# Patient Record
Sex: MALE | Race: WHITE | Employment: PART TIME | URBAN - METROPOLITAN AREA
[De-identification: names, ages, dates, MRNs, and addresses within clinical notes are randomized per-mention and may not be internally consistent; named-entity substitution may affect disease eponyms.]

---

## 2021-07-08 ENCOUNTER — OFFICE VISIT (OUTPATIENT)
Dept: ENDOCRINOLOGY | Age: 51
End: 2021-07-08
Payer: OTHER GOVERNMENT

## 2021-07-08 VITALS
HEIGHT: 65 IN | DIASTOLIC BLOOD PRESSURE: 64 MMHG | WEIGHT: 174 LBS | SYSTOLIC BLOOD PRESSURE: 128 MMHG | BODY MASS INDEX: 28.99 KG/M2 | HEART RATE: 61 BPM

## 2021-07-08 DIAGNOSIS — E10.65 TYPE 1 DIABETES MELLITUS WITH HYPERGLYCEMIA (HCC): Primary | ICD-10-CM

## 2021-07-08 DIAGNOSIS — E03.9 HYPOTHYROIDISM, UNSPECIFIED TYPE: ICD-10-CM

## 2021-07-08 DIAGNOSIS — E55.9 VITAMIN D DEFICIENCY: ICD-10-CM

## 2021-07-08 LAB — HBA1C MFR BLD: 7.2 %

## 2021-07-08 PROCEDURE — 83036 HEMOGLOBIN GLYCOSYLATED A1C: CPT | Performed by: INTERNAL MEDICINE

## 2021-07-08 PROCEDURE — 99204 OFFICE O/P NEW MOD 45 MIN: CPT | Performed by: INTERNAL MEDICINE

## 2021-07-08 PROCEDURE — 3051F HG A1C>EQUAL 7.0%<8.0%: CPT | Performed by: INTERNAL MEDICINE

## 2021-07-08 RX ORDER — IBUPROFEN 600 MG/1
1 TABLET ORAL PRN
Qty: 1 KIT | Refills: 5 | Status: SHIPPED | OUTPATIENT
Start: 2021-07-08

## 2021-07-08 NOTE — PROGRESS NOTES
[address] [place] [address] Department of Endocrinology Diabetes and Metabolism   [address] [facility] [address]   Phone: [phone]  Fax: [phone]    Date of Service: 7/8/2021  Primary Care Physician: Vladimir Brito MD  Referring physician: Radu Lackey  Provider: Tri Resendez MD     Reason for the visit:  DM type 1     History of Present Illness: The history is provided by the patient. No  was used. Accuracy of the patient data is excellent. Donald Mabry is a very pleasant 52 y.o. male seen today for diabetes management     Donald Mabry was diagnosed with diabetes long time afo and currently on 670g Medtronic insulin pump   Current pump settings: basal rate 12a 0.7, 7a 0.575, CR 12a 8.5, 5p 7.0, ISF 12a 80, 6p 70, goal , active insulin time 3hr    The patient has been checking blood sugar 4 times a day and most readings at goal   Pt is performing a lot of physical activity at work and reported hard time keeping pump site in place    Most recent A1c results summarized below  Lab Results   Component Value Date    LABA1C 7.2 07/08/2021     Patient has had no hypoglycemic episodes   The patient has been mindful of what has been eating and following diabetic diet as encouraged  I reviewed current medications and the patient has no issues with diabetes medications  Donald Mabry is up to date with eye exam and denied any history of diabetic retinopathy   The patient performs his own feet care  Microvascular complications:  No Retinopathy, Nephropathy or Neuropathy   Macrovascular complications: no CAD, PVD, or Stroke    PAST MEDICAL HISTORY   No past medical history on file. PAST SURGICAL HISTORY   No past surgical history on file. SOCIAL HISTORY   Tobacco:   reports that he has never smoked. He has never used smokeless tobacco.  Alcohol:   reports current alcohol use of about 3.0 standard drinks of alcohol per week.   Drugs:   reports no history of drug use.    FAMILY HISTORY   No family history on file. ALLERGIES AND DRUG REACTIONS   No Known Allergies    CURRENT MEDICATIONS   Current Outpatient Medications   Medication Sig Dispense Refill    Glucagon, rDNA, (GLUCAGON EMERGENCY) 1 MG KIT Inject 1 mg as directed as needed (HYPOGLYCEMIA) 1 kit 5     No current facility-administered medications for this visit. Review of Systems  Constitutional: No fever, no chills, no diaphoresis, no generalized weakness. HEENT: No blurred vision, No sore throat, no ear pain, no hair loss  Neck: denied any neck swelling, difficulty swallowing,   Cardio-pulmonary: No CP, SOB or palpitation, No orthopnea or PND. No cough or wheezing. GI: No N/V/D, no constipation, No abdominal pain, no melena or hematochezia   : Denied any dysuria, hematuria, flank pain, discharge, or incontinence. Skin: denied any rash, ulcer, Hirsute, or hyperpigmentation. MSK: denied any joint deformity, joint pain/swelling, muscle pain, or back pain. Neuro: no numbness, no tingling, no weakness, _    OBJECTIVE    /64   Pulse 61   Ht 5' 5\" (1.651 m)   Wt 174 lb (78.9 kg)   BMI 28.96 kg/m²   BP Readings from Last 4 Encounters:   07/08/21 128/64     Wt Readings from Last 6 Encounters:   07/08/21 174 lb (78.9 kg)       Physical examination:  General: awake alert, oriented x3, no abnormal position or movements. HEENT: normocephalic non-traumatic, no exophthalmos   Neck: supple, no LN enlargement, no thyromegaly, no thyroid tenderness, no JVD. Pulm: Clear equal air entry no added sounds, no wheezing or rhonchi    CVS: S1 + S2, no murmur, no heave. Dorsalis pedis pulse palpable   Abd: soft lax, no tenderness, no organomegaly, audible bowel sounds. Skin: warm, no lesions, no rash.  No callus, no Ulcers, No acanthosis nigricans  Musculoskeletal: No back tenderness, no kyphosis/scoliosis    Neuro: CN intact, Monofilament sensation decreased bilateral , muscle power normal  Psych: normal mood, and affect      Review of Laboratory Data:  I personally reviewed the following lab:  No results found for: WBC, RBC, HGB, HCT, MCV, MCH, MCHC, RDW, PLT, MPV, GRANULOCYTES, BANDS   No results found for: NA, K, CO2, BUN, CREATININE, CALCIUM, LABGLOM, GFRAA   No results found for: TSH, T4FREE, O9RNIEX, FT3, V3SZAGQ, TSI, TPOABS, THGAB  Lab Results   Component Value Date    LABA1C 7.2 07/08/2021     Lab Results   Component Value Date    LABA1C 7.2 07/08/2021     No results found for: TRIG, HDL, LDLCALC, CHOL  No results found for: Betty Mckeon, a 52 y.o.-old male seen in for the following issues     Diabetes Mellitus Type 1    · Under good control   · currently on Medtronic insulin pump with following settings: basal rate 12a 0.7, 7a 0.575, CR 12a 8.5, 5p 7.0, ISF 12a 80, 6p 70, goal , active insulin time 3hr    · The patient is performing a lot of physical activity at work and reported hard time keeping pump site in place  · Will order him Omnipod insulin pump with DEXCOM CGM l  · The patient counseled about the complications of uncontrolled diabetes   · Patient will need routine diabetes maintenance and prevention  · Diabetes labs before next visit     Hypothyroidism   · Check TFT now     I personally reviewed external notes from PCP and other patient's care team providers, and personally interpreted labs associated with the above diagnosis. I also ordered labs to further assess and manage the above addressed medical conditions. Return in about 8 weeks (around 9/2/2021) for DM type 1, VitD deficiency. The above issues were reviewed with the patient who understood and agreed with the plan. Thank you for allowing us to participate in the care of this patient. Please do not hesitate to contact us with any additional questions. Diagnosis Orders   1.  Type 1 diabetes mellitus with hyperglycemia (HCC)  Glucagon, rDNA, (GLUCAGON EMERGENCY) 1 MG KIT    Basic Metabolic Panel    Hemoglobin A1C   2. Hypothyroidism, unspecified type  TSH without Reflex    FREE T4   3. Vitamin D deficiency  Basic Metabolic Panel    Vitamin D 25 Hydroxy     Meryle Laurence MD  Endocrinologist, [facility] Diabetes Care and Endocrinology   [address] [facility] [address]   Phone: [phone]  Fax: [phone]  --------------------------------------------  An electronic signature was used to authenticate this note.  Rowan Howard MD on 7/8/2021 at 11:24 PM

## 2021-07-28 ENCOUNTER — APPOINTMENT (OUTPATIENT)
Dept: CT IMAGING | Age: 51
End: 2021-07-28
Payer: OTHER GOVERNMENT

## 2021-07-28 ENCOUNTER — HOSPITAL ENCOUNTER (EMERGENCY)
Age: 51
Discharge: HOME | End: 2021-07-28
Attending: EMERGENCY MEDICINE
Payer: OTHER GOVERNMENT

## 2021-07-28 VITALS
HEIGHT: 65 IN | DIASTOLIC BLOOD PRESSURE: 72 MMHG | OXYGEN SATURATION: 96 % | HEART RATE: 56 BPM | BODY MASS INDEX: 28.82 KG/M2 | TEMPERATURE: 98 F | RESPIRATION RATE: 12 BRPM | WEIGHT: 173 LBS | SYSTOLIC BLOOD PRESSURE: 112 MMHG

## 2021-07-28 DIAGNOSIS — E10.9 TYPE 1 DIABETES MELLITUS WITHOUT COMPLICATION (HCC): ICD-10-CM

## 2021-07-28 DIAGNOSIS — R51.9 NONINTRACTABLE HEADACHE, UNSPECIFIED CHRONICITY PATTERN, UNSPECIFIED HEADACHE TYPE: Primary | ICD-10-CM

## 2021-07-28 LAB
ANION GAP SERPL CALCULATED.3IONS-SCNC: 8 MMOL/L (ref 7–16)
BASOPHILS ABSOLUTE: 0.07 E9/L (ref 0–0.2)
BASOPHILS RELATIVE PERCENT: 0.7 % (ref 0–2)
BUN BLDV-MCNC: 19 MG/DL (ref 6–20)
CALCIUM SERPL-MCNC: 9.3 MG/DL (ref 8.6–10.2)
CHLORIDE BLD-SCNC: 99 MMOL/L (ref 98–107)
CO2: 29 MMOL/L (ref 22–29)
CREAT SERPL-MCNC: 1 MG/DL (ref 0.7–1.2)
EOSINOPHILS ABSOLUTE: 0.11 E9/L (ref 0.05–0.5)
EOSINOPHILS RELATIVE PERCENT: 1.1 % (ref 0–6)
GFR AFRICAN AMERICAN: >60
GFR NON-AFRICAN AMERICAN: >60 ML/MIN/1.73
GLUCOSE BLD-MCNC: 165 MG/DL (ref 74–99)
HCT VFR BLD CALC: 45.2 % (ref 37–54)
HEMOGLOBIN: 15.2 G/DL (ref 12.5–16.5)
IMMATURE GRANULOCYTES #: 0.02 E9/L
IMMATURE GRANULOCYTES %: 0.2 % (ref 0–5)
LYMPHOCYTES ABSOLUTE: 1.81 E9/L (ref 1.5–4)
LYMPHOCYTES RELATIVE PERCENT: 17.8 % (ref 20–42)
MAGNESIUM: 2.3 MG/DL (ref 1.6–2.6)
MCH RBC QN AUTO: 31.5 PG (ref 26–35)
MCHC RBC AUTO-ENTMCNC: 33.6 % (ref 32–34.5)
MCV RBC AUTO: 93.8 FL (ref 80–99.9)
MONOCYTES ABSOLUTE: 0.61 E9/L (ref 0.1–0.95)
MONOCYTES RELATIVE PERCENT: 6 % (ref 2–12)
NEUTROPHILS ABSOLUTE: 7.57 E9/L (ref 1.8–7.3)
NEUTROPHILS RELATIVE PERCENT: 74.2 % (ref 43–80)
PDW BLD-RTO: 12.4 FL (ref 11.5–15)
PLATELET # BLD: 260 E9/L (ref 130–450)
PMV BLD AUTO: 10.1 FL (ref 7–12)
POTASSIUM SERPL-SCNC: 4.3 MMOL/L (ref 3.5–5)
RBC # BLD: 4.82 E12/L (ref 3.8–5.8)
SODIUM BLD-SCNC: 136 MMOL/L (ref 132–146)
TROPONIN, HIGH SENSITIVITY: 6 NG/L (ref 0–11)
WBC # BLD: 10.2 E9/L (ref 4.5–11.5)

## 2021-07-28 PROCEDURE — 2580000003 HC RX 258: Performed by: EMERGENCY MEDICINE

## 2021-07-28 PROCEDURE — 85025 COMPLETE CBC W/AUTO DIFF WBC: CPT

## 2021-07-28 PROCEDURE — 93005 ELECTROCARDIOGRAM TRACING: CPT | Performed by: PHYSICIAN ASSISTANT

## 2021-07-28 PROCEDURE — 6360000002 HC RX W HCPCS: Performed by: EMERGENCY MEDICINE

## 2021-07-28 PROCEDURE — 96375 TX/PRO/DX INJ NEW DRUG ADDON: CPT

## 2021-07-28 PROCEDURE — 84484 ASSAY OF TROPONIN QUANT: CPT

## 2021-07-28 PROCEDURE — 83735 ASSAY OF MAGNESIUM: CPT

## 2021-07-28 PROCEDURE — 36415 COLL VENOUS BLD VENIPUNCTURE: CPT

## 2021-07-28 PROCEDURE — 80048 BASIC METABOLIC PNL TOTAL CA: CPT

## 2021-07-28 PROCEDURE — 70450 CT HEAD/BRAIN W/O DYE: CPT

## 2021-07-28 PROCEDURE — 99281 EMR DPT VST MAYX REQ PHY/QHP: CPT

## 2021-07-28 PROCEDURE — 96374 THER/PROPH/DIAG INJ IV PUSH: CPT

## 2021-07-28 PROCEDURE — 99283 EMERGENCY DEPT VISIT LOW MDM: CPT

## 2021-07-28 RX ORDER — METOCLOPRAMIDE HYDROCHLORIDE 5 MG/ML
10 INJECTION INTRAMUSCULAR; INTRAVENOUS ONCE
Status: COMPLETED | OUTPATIENT
Start: 2021-07-28 | End: 2021-07-28

## 2021-07-28 RX ORDER — METOCLOPRAMIDE 10 MG/1
10 TABLET ORAL 4 TIMES DAILY PRN
Qty: 20 TABLET | Refills: 0 | Status: SHIPPED | OUTPATIENT
Start: 2021-07-28 | End: 2021-08-02

## 2021-07-28 RX ORDER — 0.9 % SODIUM CHLORIDE 0.9 %
1000 INTRAVENOUS SOLUTION INTRAVENOUS ONCE
Status: COMPLETED | OUTPATIENT
Start: 2021-07-28 | End: 2021-07-28

## 2021-07-28 RX ORDER — DIPHENHYDRAMINE HYDROCHLORIDE 50 MG/ML
25 INJECTION INTRAMUSCULAR; INTRAVENOUS ONCE
Status: COMPLETED | OUTPATIENT
Start: 2021-07-28 | End: 2021-07-28

## 2021-07-28 RX ADMIN — DIPHENHYDRAMINE HYDROCHLORIDE 25 MG: 50 INJECTION, SOLUTION INTRAMUSCULAR; INTRAVENOUS at 16:48

## 2021-07-28 RX ADMIN — METOCLOPRAMIDE HYDROCHLORIDE 10 MG: 5 INJECTION INTRAMUSCULAR; INTRAVENOUS at 16:48

## 2021-07-28 RX ADMIN — SODIUM CHLORIDE 1000 ML: 9 INJECTION, SOLUTION INTRAVENOUS at 16:48

## 2021-07-28 ASSESSMENT — PAIN DESCRIPTION - LOCATION: LOCATION: HEAD

## 2021-07-28 ASSESSMENT — PAIN SCALES - GENERAL: PAINLEVEL_OUTOF10: 4

## 2021-07-28 NOTE — ED NOTES
Currently rating pain at 1/10, reports blurry vision has resolved.       Lizzette Orozco, HERNESTO  07/28/21 1800

## 2021-07-28 NOTE — ED PROVIDER NOTES
Department of Emergency Medicine   ED  Provider Note  Admit Date/RoomTime: 7/28/2021  3:52 PM  ED Room: [facility]          History of Present Illness:  7/28/21, Time: 4:27 PM EDT  Chief Complaint   Patient presents with    Headache     for last 5 days. states also heart rate elevations with them. States some dizziness. Went to chiropractor with no relief. States blurry vision in right eye. Also a type 1 diabetic and has had trouble lately keeping BGL down. States was 210 today                Arcelia Govea is a 52 y.o. male presenting to the ED for HA, beginning several days. The complaint has been persistent, moderate in severity, and worsened by his heart rate being high. Presents for headache. States right-sided unilateral headache for the last several days. Denies any traumas falls or injuries. Reports he notices it worse whenever his heart rate is fast.  Thought he had some blurry vision earlier which is since resolved. Went to a chiropractor without relief. Denies any nausea vomiting chest pain shortness of breath or palpitations. States he notices headache is worse whenever he is exerting himself and his heart rate is quicker. Is an insulin taking diabetic on a pump. Notes blood sugars have been running higher than normal.    Review of Systems:   Pertinent positives and negatives are stated within HPI, all other systems reviewed and are negative.        --------------------------------------------- PAST HISTORY ---------------------------------------------  Past Medical History:  has a past medical history of Diabetes mellitus ([facility] [address] Hyperlipidemia, and Thyroid disease. Past Surgical History:  has no past surgical history on file. Social History:  reports that he has never smoked. He has never used smokeless tobacco. He reports current alcohol use of about 3.0 standard drinks of alcohol per week. He reports that he does not use drugs. Family History: family history is not on file. . Unless otherwise noted, family history is non contributory    The patients home medications have been reviewed. Allergies: Patient has no known allergies. ---------------------------------------------------PHYSICAL EXAM--------------------------------------    Constitutional/General: Alert and oriented x3  Head: Normocephalic and atraumatic no temporal artery tenderness  Eyes: PERRL, EOMI, sclera non icteric no nystagmus. Pupils are 6 mm react bilaterally  Mouth: Oropharynx clear, handling secretions, no trismus, no asymmetry of the posterior oropharynx or uvular edema  Neck: Supple, full ROM, no stridor, no meningeal signs  Respiratory: Lungs clear to auscultation bilaterally,Not in respiratory distress  Cardiovascular:  Regular rate. Regular rhythm. 2+ distal pulses. Equal extremity pulses. Chest: No chest wall tenderness  GI:  Abdomen Soft, Non tender, Non distended. No rebound, guarding, or rigidity. Musculoskeletal: Moves all extremities x 4. Warm and well perfused, no clubbing, cyanosis, or edema. Capillary refill <3 seconds  Integument: skin warm and dry. No rashes. Neurologic: GCS 15, no focal deficits, symmetric strength 5/5 in the upper and lower extremities bilaterally  Psychiatric: Normal Affect      EKG: Interpreted by emergency department physician, Dr. Chucky Luis   This EKG is signed and interpreted by me. Rate: 63  Rhythm: Sinus  Interpretation: Sinus rhythm, normal axis, MA is 154, QRS is 92, QTc is 397. No other acute findings no prior for comparison  Comparison: no previous EKG available      -------------------------------------------------- RESULTS -------------------------------------------------  I have personally reviewed all laboratory and imaging results for this patient. Results are listed below.      LABS: (Lab results interpreted by me)  Results for orders placed or performed during the hospital encounter of 07/28/21   CBC Auto Differential   Result Value Ref Range    WBC 10.2 4.5 - 11.5 E9/L    RBC 4.82 3.80 - 5.80 E12/L    Hemoglobin 15.2 12.5 - 16.5 g/dL    Hematocrit 45.2 37.0 - 54.0 %    MCV 93.8 80.0 - 99.9 fL    MCH 31.5 26.0 - 35.0 pg    MCHC 33.6 32.0 - 34.5 %    RDW 12.4 11.5 - 15.0 fL    Platelets 111 813 - 452 E9/L    MPV 10.1 7.0 - 12.0 fL    Neutrophils % 74.2 43.0 - 80.0 %    Immature Granulocytes % 0.2 0.0 - 5.0 %    Lymphocytes % 17.8 (L) 20.0 - 42.0 %    Monocytes % 6.0 2.0 - 12.0 %    Eosinophils % 1.1 0.0 - 6.0 %    Basophils % 0.7 0.0 - 2.0 %    Neutrophils Absolute 7.57 (H) 1.80 - 7.30 E9/L    Immature Granulocytes # 0.02 E9/L    Lymphocytes Absolute 1.81 1.50 - 4.00 E9/L    Monocytes Absolute 0.61 0.10 - 0.95 E9/L    Eosinophils Absolute 0.11 0.05 - 0.50 E9/L    Basophils Absolute 0.07 0.00 - 0.20 A4/I   Basic Metabolic Panel   Result Value Ref Range    Sodium 136 132 - 146 mmol/L    Potassium 4.3 3.5 - 5.0 mmol/L    Chloride 99 98 - 107 mmol/L    CO2 29 22 - 29 mmol/L    Anion Gap 8 7 - 16 mmol/L    Glucose 165 (H) 74 - 99 mg/dL    BUN 19 6 - 20 mg/dL    CREATININE 1.0 0.7 - 1.2 mg/dL    GFR Non-African American >60 >=60 mL/min/1.73    GFR African American >60     Calcium 9.3 8.6 - 10.2 mg/dL   Troponin   Result Value Ref Range    Troponin, High Sensitivity 6 0 - 11 ng/L   Magnesium   Result Value Ref Range    Magnesium 2.3 1.6 - 2.6 mg/dL   EKG 12 Lead   Result Value Ref Range    Ventricular Rate 63 BPM    Atrial Rate 63 BPM    P-R Interval 154 ms    QRS Duration 92 ms    Q-T Interval 388 ms    QTc Calculation (Bazett) 397 ms    P Axis 66 degrees    R Axis 53 degrees    T Axis 0 degrees   ,       RADIOLOGY:  Interpreted by Radiologist unless otherwise specified  CT HEAD WO CONTRAST   Final Result   No acute intracranial abnormality.                          ------------------------- NURSING NOTES AND VITALS REVIEWED ---------------------------   The nursing notes within the ED encounter and vital signs as below have been reviewed by myself  /72 Comment: MANUAL  Pulse 56   Temp 98 °F (36.7 °C)   Resp 12   Ht 5' 5\" (1.651 m)   Wt 173 lb (78.5 kg)   SpO2 96%   BMI 28.79 kg/m²     Oxygen Saturation Interpretation: Normal    The cardiac monitor revealed NSR with a heart rate in the 70s as interpreted by me. The cardiac monitor was ordered secondary to the patient's heart rate and to monitor the patient for dysrhythmia. CPT 38797    The patients available past medical records and past encounters were reviewed. ------------------------------ ED COURSE/MEDICAL DECISION MAKING----------------------  Medications   diphenhydrAMINE (BENADRYL) injection 25 mg (25 mg Intravenous Given 7/28/21 1648)   metoclopramide (REGLAN) injection 10 mg (10 mg Intravenous Given 7/28/21 1648)   0.9 % sodium chloride bolus (0 mLs Intravenous Stopped 7/28/21 1759)                    Medical Decision Making:     IDr. Ailyn am the primary provider of record    Work-up undertaken. Electrolytes within normal limits. No evidence of high anion gap metabolic acidosis or DKA. Head CT within normal limits. He had near complete resolution of his headache after Benadryl Reglan. Discussed part of care and need for follow-up with primary care. He states understanding agreement      Re-Evaluations:          Re-evaluation. Patients symptoms are improving  Repeat physical examination is improved        This patient's ED course included: a personal history and physicial examination, re-evaluation prior to disposition, IV medications and complex medical decision making and emergency management    This patient has remained hemodynamically stable during their ED course. Counseling: The emergency provider has spoken with the patient and discussed todays results, in addition to providing specific details for the plan of care and counseling regarding the diagnosis and prognosis. Questions are answered at this time and they are agreeable with the plan. --------------------------------- IMPRESSION AND DISPOSITION ---------------------------------    IMPRESSION  1. Nonintractable headache, unspecified chronicity pattern, unspecified headache type    2. [facility]        DISPOSITION  Disposition: Discharge to home  Patient condition is stable        NOTE: This report was transcribed using voice recognition software.  Every effort was made to ensure accuracy; however, inadvertent computerized transcription errors may be present       Lazaro Parker DO  07/28/21 2033

## 2021-07-28 NOTE — ED NOTES
Patient currently off floor in ct      Israel Osorio, [facility]  07/28/21 3417

## 2021-07-28 NOTE — ED TRIAGE NOTES
FIRST PROVIDER CONTACT ASSESSMENT NOTE      Department of Emergency Medicine   7/28/21  3:30 PM EDT    Chief Complaint: Headache (for last 5 days. states also heart rate elevations with them. States some dizziness. Went to chiropractor with no relief. States blurry vision in right eye. Also a type 1 diabetic and has had trouble lately keeping BGL down. States was 210 today)      History of Present Illness:    Stephanie Combs is a 52 y.o. male who presents to the ED by private car for intermittent HA x 1 week. Pt c/o dizziness, blurred vision rt eye. Pt also c/o elevated HR  Focused Screening Exam:  Constitutional:  Alert, appears stated age and is in no distress. *ALLERGIES*     Patient has no known allergies.      ED Triage Vitals [07/28/21 1439]   BP Temp Temp src Pulse Resp SpO2 Height Weight   -- 97.5 °F (36.4 °C) -- 74 -- 95 % -- --        Initial Plan of Care:  Initiate Treatment-Testing, Proceed toTreatment Area When Bed Available for ED Attending/MLP to Continue Care    -----------------END OF FIRST PROVIDER CONTACT ASSESSMENT NOTE--------------  Electronically signed by Candace Johnson PA-C   DD: 7/28/21

## 2021-07-29 LAB
EKG ATRIAL RATE: 63 BPM
EKG P AXIS: 66 DEGREES
EKG P-R INTERVAL: 154 MS
EKG Q-T INTERVAL: 388 MS
EKG QRS DURATION: 92 MS
EKG QTC CALCULATION (BAZETT): 397 MS
EKG R AXIS: 53 DEGREES
EKG T AXIS: 0 DEGREES
EKG VENTRICULAR RATE: 63 BPM

## 2021-07-29 PROCEDURE — 93010 ELECTROCARDIOGRAM REPORT: CPT | Performed by: INTERNAL MEDICINE

## 2021-08-31 DIAGNOSIS — E10.65 TYPE 1 DIABETES MELLITUS WITH HYPERGLYCEMIA (HCC): Primary | ICD-10-CM

## 2021-08-31 RX ORDER — INSULIN PUMP CONTROLLER
EACH MISCELLANEOUS
Qty: 45 EACH | Refills: 3 | Status: SHIPPED
Start: 2021-08-31 | End: 2022-02-23 | Stop reason: SDUPTHER

## 2021-09-02 ENCOUNTER — OFFICE VISIT (OUTPATIENT)
Dept: ENDOCRINOLOGY | Age: 51
End: 2021-09-02
Payer: OTHER GOVERNMENT

## 2021-09-02 VITALS
HEART RATE: 60 BPM | OXYGEN SATURATION: 100 % | BODY MASS INDEX: 29.16 KG/M2 | SYSTOLIC BLOOD PRESSURE: 104 MMHG | WEIGHT: 175 LBS | HEIGHT: 65 IN | DIASTOLIC BLOOD PRESSURE: 70 MMHG

## 2021-09-02 DIAGNOSIS — Z96.41 INSULIN PUMP IN PLACE: ICD-10-CM

## 2021-09-02 DIAGNOSIS — E10.65 TYPE 1 DIABETES MELLITUS WITH HYPERGLYCEMIA (HCC): Primary | ICD-10-CM

## 2021-09-02 DIAGNOSIS — E55.9 VITAMIN D DEFICIENCY: ICD-10-CM

## 2021-09-02 DIAGNOSIS — E03.9 PRIMARY HYPOTHYROIDISM: ICD-10-CM

## 2021-09-02 PROCEDURE — 99214 OFFICE O/P EST MOD 30 MIN: CPT | Performed by: CLINICAL NURSE SPECIALIST

## 2021-09-02 PROCEDURE — 3051F HG A1C>EQUAL 7.0%<8.0%: CPT | Performed by: CLINICAL NURSE SPECIALIST

## 2021-09-02 RX ORDER — LEVOTHYROXINE SODIUM 0.03 MG/1
TABLET ORAL
COMMUNITY
Start: 2021-06-08 | End: 2022-02-23

## 2021-09-02 RX ORDER — ATORVASTATIN CALCIUM 40 MG/1
TABLET, FILM COATED ORAL
COMMUNITY
Start: 2021-02-25

## 2021-09-02 NOTE — PROGRESS NOTES
[address] [place] [address] Department of Endocrinology Diabetes and Metabolism   [address] [facility] [address]   Phone: [phone]  Fax: [phone]    Date of Service: 9/2/2021    Primary Care Physician: Rheba Cranker, MD  Referring physician: No ref. provider found  Provider: Christine WELLS    Reason for the visit:Type 1 diabetes       History of Present Illness: The history is provided by the patient. No  was used. Accuracy of the patient data is excellent. Chio Grove is a very pleasant 52 y.o. male seen today for diabetes management   Chio Grove was diagnosed with diabetes long time afo and currently on 670g Medtronic insulin pump   Current pump settings: basal rate 12a 0.75, 7a 0.575, CR 12a 8.5, 5p 7.0, ISF 12a 80, 6p 70, goal , active insulin time 3hr    The patient has been checking blood sugar 4 times a day and most readings at goal   Average 189  Pt is performing a lot of physical activity at work and reported hard time keeping pump site in place    Most recent A1c results summarized below    Lab Results   Component Value Date    LABA1C 7.2 07/08/2021       Patient has had no hypoglycemic episodes   The patient has been mindful of what has been eating and following diabetic diet as encouraged  I reviewed current medications and the patient has no issues with diabetes medications  Chio Grove is up to date with eye exam and denied any history of diabetic retinopathy   The patient performs his own feet care  Microvascular complications:  No Retinopathy, Nephropathy or Neuropathy   Macrovascular complications: no CAD, PVD, or Stroke    PAST MEDICAL HISTORY   Past Medical History:   Diagnosis Date    Asthma     Diabetes mellitus ([address]     Hyperlipidemia     Migraine     Thyroid disease        PAST SURGICAL HISTORY   No past surgical history on file. SOCIAL HISTORY   Tobacco:   reports that he has never smoked.  He has never used smokeless tobacco.  Alcohol:   reports current alcohol use of about 3.0 standard drinks of alcohol per week. Drugs:   reports no history of drug use. FAMILY HISTORY   Family History   Problem Relation Age of Onset    Diabetes type 2  Father     Diabetes Type 1  Maternal Grandmother     Cancer Maternal Grandfather        ALLERGIES AND DRUG REACTIONS   No Known Allergies    CURRENT MEDICATIONS   Current Outpatient Medications   Medication Sig Dispense Refill    atorvastatin (LIPITOR) 40 MG tablet TAKE ONE TABLET BY MOUTH AT BEDTIME FOR CHOLESTEROL      levothyroxine (SYNTHROID) 25 MCG tablet TAKE ONE TABLET BY MOUTH EVERY MORNING, ON AN EMPTY STOMACH 30-60 MINUTES BEFORE BREAKFAST      insulin aspart (NOVOLOG) 100 UNIT/ML injection vial INJECT 50 UNITS SUBCUTANEOUSLY CONTINUOUS VIA PUMP (DISCARD VIAL 28 DAYS AFTER FIRST USE)      Cholecalciferol 50 MCG (2000 UT) TABS TAKE TWO TABLETS BY MOUTH EVERY DAY FOR VITAMIN D      Insulin Disposable Pump (OMNIPOD DASH 5 PACK PODS) MISC To change every 48hrs 45 each 3    Glucagon, rDNA, (GLUCAGON EMERGENCY) 1 MG KIT Inject 1 mg as directed as needed (HYPOGLYCEMIA) 1 kit 5    metoclopramide (REGLAN) 10 MG tablet Take 1 tablet by mouth 4 times daily as needed (Nausea or vomiting or headache) 20 tablet 0     No current facility-administered medications for this visit. Review of Systems  Constitutional: No fever, no chills, no diaphoresis, no generalized weakness. HEENT: No blurred vision, No sore throat, no ear pain, no hair loss  Neck: denied any neck swelling, difficulty swallowing,   Cardio-pulmonary: No CP, SOB or palpitation, No orthopnea or PND. No cough or wheezing. GI: No N/V/D, no constipation, No abdominal pain, no melena or hematochezia   : Denied any dysuria, hematuria, flank pain, discharge, or incontinence. Skin: denied any rash, ulcer, Hirsute, or hyperpigmentation.    MSK: denied any joint deformity, joint pain/swelling, muscle pain, or back pain.  Neuro: no numbness, no tingling, no weakness, _    OBJECTIVE    /70   Pulse 60   Ht 5' 5\" (1.651 m)   Wt 175 lb (79.4 kg)   SpO2 100%   BMI 29.12 kg/m²   BP Readings from Last 4 Encounters:   09/02/21 104/70   07/28/21 112/72   07/08/21 128/64     Wt Readings from Last 6 Encounters:   09/02/21 175 lb (79.4 kg)   07/28/21 173 lb (78.5 kg)   07/08/21 174 lb (78.9 kg)       Physical examination:  General: awake alert, oriented x3, no abnormal position or movements. HEENT: normocephalic non-traumatic, no exophthalmos   Neck: supple, no LN enlargement, no thyromegaly, no thyroid tenderness, no JVD. Pulm: Clear equal air entry no added sounds, no wheezing or rhonchi    CVS: S1 + S2, no murmur, no heave. Dorsalis pedis pulse palpable   Abd: soft lax, no tenderness, no organomegaly, audible bowel sounds. Skin: warm, no lesions, no rash.  No callus, no Ulcers, No acanthosis nigricans  Musculoskeletal: No back tenderness, no kyphosis/scoliosis    Neuro: CN intact,  , muscle power normal  Psych: normal mood, and affect      Review of Laboratory Data:  I personally reviewed the following lab:  Lab Results   Component Value Date/Time    WBC 8.3 08/20/2021 12:00 AM    RBC 4.82 08/20/2021 12:00 AM    HGB 15.5 08/20/2021 12:00 AM    HCT 45.1 08/20/2021 12:00 AM    MCV 93.6 08/20/2021 12:00 AM    MCH 32.2 08/20/2021 12:00 AM    MCHC 34.4 08/20/2021 12:00 AM    RDW 13.2 08/20/2021 12:00 AM     08/20/2021 12:00 AM    MPV 8.4 08/20/2021 12:00 AM      Lab Results   Component Value Date/Time     08/20/2021 12:00 AM    K 3.7 08/20/2021 12:00 AM    CO2 27 08/20/2021 12:00 AM    BUN 14 08/20/2021 12:00 AM    CREATININE 1.1 08/20/2021 12:00 AM    CALCIUM 9.5 08/20/2021 12:00 AM    LABGLOM >60 07/28/2021 03:43 PM    GFRAA >60 07/28/2021 03:43 PM      No results found for: TSH, T4FREE, R6RUCMU, FT3, Q8EHWOI, TSI, TPOABS, THGAB  Lab Results   Component Value Date    LABA1C 7.2 07/08/2021    GLUCOSE 165 08/20/2021     Lab Results   Component Value Date    LABA1C 7.2 07/08/2021     No results found for: TRIG, HDL, LDLCALC, CHOL  No results found for: Jose Angel 30   Nilsa Booker, a 52 y.o.-old male seen in for the following issues       Assessment:      Diagnosis Orders   1. Type 1 diabetes mellitus with hyperglycemia (HCC)  T4, Free    TSH without Reflex    Thyroid Peroxidase Antibody    Comprehensive Metabolic Panel    Vitamin D 25 Hydroxy    Microalbumin / Creatinine Urine Ratio    Lipid Panel   2. Primary hypothyroidism     3. Vitamin D deficiency  Vitamin D 25 Hydroxy   4. Insulin pump in place         Plan:     1. Type 1 diabetes mellitus with hyperglycemia (HCC)   Patient diabetes shows variable control. Blood glucose levels higher in the morning and at bedtime  Plan: Increase midnight basal rate to 0.8, add 7 PM basal of 0.625. Adjust carb ratio at 5 PM to 6.5. Insulin pump settings will look as follows:  basal rate 12a 0.8, 7a 0.575, 7 pm 0.625, CR 12a 8.5, 5p 6.5, ISF 12a 80, 6p 70, goal , active insulin time 3hr   Advised patient to continue checking blood sugars 4 times per day  Advised to call if blood glucose less than 70 or greater than 250 3 consecutive times  Counseled on blood glucose goals fasting less than 130, peak less than 180, random blood glucose less than 150  Very infrequent hypoglycemia  Counseled on hypoglycemia  Patient will be getting OmniPod soon as this is what he prefers since he is more active  Will monitor     2. Primary hypothyroidism   Continue levothyroxine 25 mcg 1 tablet once daily. Patient taking on an empty stomach least 30 minutes before breakfast and without combination of other medications. Will reassess TSH and free T4   3. Vitamin D deficiency  Continue vitamin D supplementation. Will reassess vitamin D   4.  Insulin pump in place            I personally spent greater than 30 minutes revieweingexternal notes from PCP and other patient's care team providers, and personally interpreted labs associated with the above diagnosis. I also ordered labs to further assess and manage the above addressed medical conditions. Return in about 3 months (around 12/2/2021). The above issues were reviewed with the patient who understood and agreed with the plan. Thank you for allowing us to participate in the care of this patient. Please do not hesitate to contact us with any additional questions. Aga WELLS    [facility] Diabetes Care and Endocrinology   [address] [place] [address]   Phone: [phone]  Fax: [phone]  --------------------------------------------  An electronic signature was used to authenticate this note.  Aga WELLS on 9/2/2021 at 2:12 PM

## 2021-09-07 ENCOUNTER — TELEPHONE (OUTPATIENT)
Dept: ENDOCRINOLOGY | Age: 51
End: 2021-09-07

## 2021-09-07 NOTE — TELEPHONE ENCOUNTER
Pt states that he does take atorvastatin daily and has maybe missed 1 or 2 doses within the last couple of weeks.

## 2021-09-07 NOTE — TELEPHONE ENCOUNTER
----- Message from FERNANDO Billy sent at 9/7/2021  8:40 AM EDT -----  Please call patient and inform him I have reviewed labs. His LDL cholesterol is not at goal.  Please inquire if patient is taking atorvastatin daily or missing doses. If he is missing doses I encourage compliance.

## 2022-02-23 ENCOUNTER — OFFICE VISIT (OUTPATIENT)
Dept: ENDOCRINOLOGY | Age: 52
End: 2022-02-23
Payer: OTHER GOVERNMENT

## 2022-02-23 VITALS
HEART RATE: 70 BPM | HEIGHT: 65 IN | WEIGHT: 179 LBS | BODY MASS INDEX: 29.82 KG/M2 | DIASTOLIC BLOOD PRESSURE: 79 MMHG | OXYGEN SATURATION: 96 % | SYSTOLIC BLOOD PRESSURE: 125 MMHG

## 2022-02-23 DIAGNOSIS — E55.9 VITAMIN D DEFICIENCY: ICD-10-CM

## 2022-02-23 DIAGNOSIS — E03.9 HYPOTHYROIDISM, UNSPECIFIED TYPE: ICD-10-CM

## 2022-02-23 DIAGNOSIS — E10.65 TYPE 1 DIABETES MELLITUS WITH HYPERGLYCEMIA (HCC): ICD-10-CM

## 2022-02-23 DIAGNOSIS — E10.65 TYPE 1 DIABETES MELLITUS WITH HYPERGLYCEMIA (HCC): Primary | ICD-10-CM

## 2022-02-23 PROCEDURE — 99214 OFFICE O/P EST MOD 30 MIN: CPT | Performed by: INTERNAL MEDICINE

## 2022-02-23 RX ORDER — INSULIN PUMP CONTROLLER
EACH MISCELLANEOUS
Qty: 45 EACH | Refills: 5 | Status: SHIPPED
Start: 2022-02-23 | End: 2022-03-02 | Stop reason: SDUPTHER

## 2022-02-23 NOTE — PROGRESS NOTES
[address] [facility] Department of Endocrinology Diabetes and Metabolism   [address] [facility] [address]   Phone: [phone]  Fax: [phone]    Date of Service: 2/23/2022  Primary Care Physician: Clint Foster MD  Provider: Rayray Stone MD\     Reason for the visit:Type 1 diabetes       History of Present Illness: The history is provided by the patient. No  was used. Accuracy of the patient data is excellent. Trey Pinzon is a very pleasant 54 y.o. male seen today for diabetes management   Trey Pinzon was diagnosed with diabetes long time afo and currently on 670g Medtronic insulin pump   Current pump settings: basal rate 12a 0.8, 7a 0.6, CR 12a 8.5, 5p 6.5, ISF 75, goal 100-130, active insulin time 3hr    The patient has been checking blood sugar 4 times a day and most readings at goal   No interested in CGM   Pt is performing a lot of physical activity at work and reported hard time keeping pump site in place    Most recent A1c results summarized below  2/2022 - A1c 7.5   Lab Results   Component Value Date    LABA1C 7.2 07/08/2021     The patient has been mindful of what has been eating and following diabetic diet as encouraged  I reviewed current medications and the patient has no issues with diabetes medications  Trey Pinzon is up to date with eye exam and denied any history of diabetic retinopathy   The patient performs his own feet care  Microvascular complications:  No Retinopathy, Nephropathy or Neuropathy   Macrovascular complications: no CAD, PVD, or Stroke    PAST MEDICAL HISTORY   Past Medical History:   Diagnosis Date    Asthma     Diabetes mellitus ([address]     Hyperlipidemia     Migraine     Thyroid disease        PAST SURGICAL HISTORY   No past surgical history on file. SOCIAL HISTORY   Tobacco:   reports that he has never smoked.  He has never used smokeless tobacco.  Alcohol:   reports current alcohol use of about 3.0 standard drinks of alcohol per week. Drugs:   reports no history of drug use. FAMILY HISTORY   Family History   Problem Relation Age of Onset    Diabetes type 2  Father     Diabetes Type 1  Maternal Grandmother     Cancer Maternal Grandfather        ALLERGIES AND DRUG REACTIONS   No Known Allergies    CURRENT MEDICATIONS   Current Outpatient Medications   Medication Sig Dispense Refill    atorvastatin (LIPITOR) 40 MG tablet TAKE ONE TABLET BY MOUTH AT BEDTIME FOR CHOLESTEROL      insulin aspart (NOVOLOG) 100 UNIT/ML injection vial INJECT 50 UNITS SUBCUTANEOUSLY CONTINUOUS VIA PUMP (DISCARD VIAL 28 DAYS AFTER FIRST USE)      Insulin Disposable Pump (OMNIPOD DASH 5 PACK PODS) MISC To change every 48hrs 45 each 5    Cholecalciferol 50 MCG (2000 UT) TABS TAKE TWO TABLETS BY MOUTH EVERY DAY FOR VITAMIN D      metoclopramide (REGLAN) 10 MG tablet Take 1 tablet by mouth 4 times daily as needed (Nausea or vomiting or headache) 20 tablet 0    Glucagon, rDNA, (GLUCAGON EMERGENCY) 1 MG KIT Inject 1 mg as directed as needed (HYPOGLYCEMIA) 1 kit 5     No current facility-administered medications for this visit. Review of Systems  Constitutional: No fever, no chills, no diaphoresis, no generalized weakness. HEENT: No blurred vision, No sore throat, no ear pain, no hair loss  Neck: denied any neck swelling, difficulty swallowing,   Cardio-pulmonary: No CP, SOB or palpitation, No orthopnea or PND. No cough or wheezing. GI: No N/V/D, no constipation, No abdominal pain, no melena or hematochezia   : Denied any dysuria, hematuria, flank pain, discharge, or incontinence. Skin: denied any rash, ulcer, Hirsute, or hyperpigmentation. MSK: denied any joint deformity, joint pain/swelling, muscle pain, or back pain.   Neuro: no numbness, no tingling, no weakness, _    OBJECTIVE    /79   Pulse 70   Ht 5' 5\" (1.651 m)   Wt 179 lb (81.2 kg)   SpO2 96%   BMI 29.79 kg/m²   BP Readings from Last 4 Encounters:   02/23/22 125/79   09/02/21 104/70   07/28/21 112/72   07/08/21 128/64     Wt Readings from Last 6 Encounters:   02/23/22 179 lb (81.2 kg)   09/02/21 175 lb (79.4 kg)   07/28/21 173 lb (78.5 kg)   07/08/21 174 lb (78.9 kg)       Physical examination:  General: awake alert, oriented x3, no abnormal position or movements. HEENT: normocephalic non-traumatic, no exophthalmos   Neck: supple, no LN enlargement, no thyromegaly, no thyroid tenderness, no JVD. Pulm: Clear equal air entry no added sounds, no wheezing or rhonchi    CVS: S1 + S2, no murmur, no heave. Dorsalis pedis pulse palpable   Abd: soft lax, no tenderness, no organomegaly, audible bowel sounds. Skin: warm, no lesions, no rash.  No callus, no Ulcers, No acanthosis nigricans  Musculoskeletal: No back tenderness, no kyphosis/scoliosis    Neuro: CN intact,  , muscle power normal  Psych: normal mood, and affect      Review of Laboratory Data:  I personally reviewed the following lab:  Lab Results   Component Value Date/Time    WBC 8.3 08/20/2021 12:00 AM    RBC 4.82 08/20/2021 12:00 AM    HGB 15.5 08/20/2021 12:00 AM    HCT 45.1 08/20/2021 12:00 AM    MCV 93.6 08/20/2021 12:00 AM    MCH 32.2 08/20/2021 12:00 AM    MCHC 34.4 08/20/2021 12:00 AM    RDW 13.2 08/20/2021 12:00 AM     08/20/2021 12:00 AM    MPV 8.4 08/20/2021 12:00 AM      Lab Results   Component Value Date/Time     09/02/2021 02:12 PM    K 4.4 09/02/2021 02:12 PM    CO2 28 09/02/2021 02:12 PM    BUN 17 09/02/2021 02:12 PM    CREATININE 1.0 09/02/2021 02:12 PM    CALCIUM 10.1 09/02/2021 02:12 PM    LABGLOM >60 09/02/2021 02:12 PM    GFRAA >60 09/02/2021 02:12 PM      Lab Results   Component Value Date/Time    TSH 2.450 09/02/2021 02:12 PM    T4FREE 0.97 09/02/2021 02:12 PM    TPOABS 11.0 09/02/2021 02:12 PM     Lab Results   Component Value Date    LABA1C 7.2 07/08/2021    GLUCOSE 176 09/02/2021    MALBCR - 09/02/2021    LABMICR <12.0 09/02/2021    LABCREA 131 09/02/2021     Lab Results Component Value Date    LABA1C 7.2 07/08/2021     Lab Results   Component Value Date    TRIG 154 09/02/2021    HDL 49 09/02/2021    LDLCALC 119 09/02/2021    CHOL 199 09/02/2021     Lab Results   Component Value Date    VITD25 34 09/02/2021       ASSESSMENT & RECOMMENDATIONS   Alfred Madison, a 54 y.o.-old male seen in for the following issues       Assessment:      Diagnosis Orders   1. Type 1 diabetes mellitus with hyperglycemia (HCC)  Basic Metabolic Panel    Hemoglobin A1C    Lipid Panel    Microalbumin / Creatinine Urine Ratio   2. Vitamin D deficiency  Vitamin D 25 Hydroxy    Basic Metabolic Panel   3. Hypothyroidism, unspecified type  TSH    T4, Free       Plan:     1. Type 1 diabetes mellitus with hyperglycemia (HCC)   · Under good control   · Continue current pump settings:   · Advised patient to continue checking blood sugars 4 times per day  · Counseled on hypoglycemia  · Not interested in CGM at this time      2. Primary hypothyroidism   · Off Levothyroxine few months ago (medication was stopped by his PCP)  · Pt would like to stay of LT4 at this time   · Check TFT at next OV    3. Vitamin D deficiency  · Continue vitamin D supplementation    4. Insulin pump in place      I personally reviewed external notes from PCP and other patient's care team providers, and personally interpreted labs associated with the above diagnosis. I also ordered labs to further assess and manage the above addressed medical conditions    Return in about 6 months (around 8/23/2022) for DM type 1, VitD deficiency. The above issues were reviewed with the patient who understood and agreed with the plan. Thank you for allowing us to participate in the care of this patient. Please do not hesitate to contact us with any additional questions.      Vikki Gabriel MD    [facility] Diabetes Care and Endocrinology   [address] [place] [address]   Phone: [phone]  Fax: [phone]  --------------------------------------------  An electronic signature was used to authenticate this note.  Guerda Royal MD  on 2/23/2022 at 12:58 PM

## 2022-03-02 DIAGNOSIS — E10.65 TYPE 1 DIABETES MELLITUS WITH HYPERGLYCEMIA (HCC): ICD-10-CM

## 2022-03-02 RX ORDER — INSULIN PUMP CONTROLLER
EACH MISCELLANEOUS
Qty: 45 EACH | Refills: 5 | Status: SHIPPED | OUTPATIENT
Start: 2022-03-02

## 2022-04-01 ENCOUNTER — TELEPHONE (OUTPATIENT)
Dept: ENDOCRINOLOGY | Age: 52
End: 2022-04-01

## 2022-05-16 ENCOUNTER — HOSPITAL ENCOUNTER (EMERGENCY)
Age: 52
Discharge: LEFT BEFORE DISCHARGE | End: 2022-05-16
Payer: OTHER GOVERNMENT

## 2022-05-16 VITALS
BODY MASS INDEX: 28.66 KG/M2 | DIASTOLIC BLOOD PRESSURE: 81 MMHG | OXYGEN SATURATION: 95 % | SYSTOLIC BLOOD PRESSURE: 107 MMHG | HEART RATE: 63 BPM | RESPIRATION RATE: 17 BRPM | HEIGHT: 65 IN | WEIGHT: 172 LBS

## 2022-05-16 LAB
ALBUMIN SERPL-MCNC: 4.3 G/DL (ref 3.5–5.2)
ALP BLD-CCNC: 68 U/L (ref 40–129)
ALT SERPL-CCNC: 24 U/L (ref 0–40)
ANION GAP SERPL CALCULATED.3IONS-SCNC: 7 MMOL/L (ref 7–16)
AST SERPL-CCNC: 30 U/L (ref 0–39)
BASOPHILS ABSOLUTE: 0.1 E9/L (ref 0–0.2)
BASOPHILS RELATIVE PERCENT: 0.9 % (ref 0–2)
BILIRUB SERPL-MCNC: 1 MG/DL (ref 0–1.2)
BUN BLDV-MCNC: 18 MG/DL (ref 6–20)
CALCIUM SERPL-MCNC: 9.3 MG/DL (ref 8.6–10.2)
CHLORIDE BLD-SCNC: 99 MMOL/L (ref 98–107)
CO2: 27 MMOL/L (ref 22–29)
CREAT SERPL-MCNC: 1.2 MG/DL (ref 0.7–1.2)
EOSINOPHILS ABSOLUTE: 0.24 E9/L (ref 0.05–0.5)
EOSINOPHILS RELATIVE PERCENT: 2.1 % (ref 0–6)
GFR AFRICAN AMERICAN: >60
GFR NON-AFRICAN AMERICAN: >60 ML/MIN/1.73
GLUCOSE BLD-MCNC: 271 MG/DL
GLUCOSE BLD-MCNC: 303 MG/DL (ref 74–99)
HCT VFR BLD CALC: 41.9 % (ref 37–54)
HEMOGLOBIN: 14.2 G/DL (ref 12.5–16.5)
IMMATURE GRANULOCYTES #: 0.02 E9/L
IMMATURE GRANULOCYTES %: 0.2 % (ref 0–5)
LACTIC ACID: 1.3 MMOL/L (ref 0.5–2.2)
LYMPHOCYTES ABSOLUTE: 2.51 E9/L (ref 1.5–4)
LYMPHOCYTES RELATIVE PERCENT: 22.2 % (ref 20–42)
MCH RBC QN AUTO: 30.9 PG (ref 26–35)
MCHC RBC AUTO-ENTMCNC: 33.9 % (ref 32–34.5)
MCV RBC AUTO: 91.1 FL (ref 80–99.9)
METER GLUCOSE: 271 MG/DL (ref 74–99)
MONOCYTES ABSOLUTE: 0.88 E9/L (ref 0.1–0.95)
MONOCYTES RELATIVE PERCENT: 7.8 % (ref 2–12)
NEUTROPHILS ABSOLUTE: 7.54 E9/L (ref 1.8–7.3)
NEUTROPHILS RELATIVE PERCENT: 66.8 % (ref 43–80)
PDW BLD-RTO: 12.4 FL (ref 11.5–15)
PLATELET # BLD: 260 E9/L (ref 130–450)
PMV BLD AUTO: 10 FL (ref 7–12)
POTASSIUM REFLEX MAGNESIUM: 4.1 MMOL/L (ref 3.5–5)
RBC # BLD: 4.6 E12/L (ref 3.8–5.8)
SODIUM BLD-SCNC: 133 MMOL/L (ref 132–146)
TOTAL PROTEIN: 7.4 G/DL (ref 6.4–8.3)
WBC # BLD: 11.3 E9/L (ref 4.5–11.5)

## 2022-05-16 PROCEDURE — 82962 GLUCOSE BLOOD TEST: CPT

## 2022-05-16 PROCEDURE — 80053 COMPREHEN METABOLIC PANEL: CPT

## 2022-05-16 PROCEDURE — 85025 COMPLETE CBC W/AUTO DIFF WBC: CPT

## 2022-05-16 PROCEDURE — 99283 EMERGENCY DEPT VISIT LOW MDM: CPT

## 2022-05-16 PROCEDURE — 83605 ASSAY OF LACTIC ACID: CPT

## 2022-05-16 NOTE — ED NOTES
Department of Emergency Medicine  FIRST PROVIDER TRIAGE NOTE             Independent MLP           5/16/22  1:43 AM EDT    Date of Encounter: 5/16/22   MRN: [id number]      HPI: Ghislaine Menendez is a 54 y.o. male who presents to the ED for Hyperglycemia (states he knocked his pump off today and didnt realize it. Was 46 when left home, rechecked self in lobby is now 313)   Pt reports his glucose was monitor got knocked off and he didn't know. His glucose was 475. He then gave himself 9 units of insulin and it came down to 318. Pt here to get checked out. ROS: Negative for abd pain or vomiting. PE: Gen Appearance/Constitutional: alert  Musculoskeletal: moves all extremities x 4     Initial Plan of Care: All treatment areas with department are currently occupied. Plan to order/Initiate the following while awaiting opening in ED: labs.   Initiate Treatment-Testing, Proceed toTreatment Area When Bed Available for ED Attending/MLP to Continue Care    Electronically signed by Aaron Cai PA-C   DD: 5/16/22         Aaron Cai PA-C  05/16/22 [id number]

## 2022-05-17 ENCOUNTER — APPOINTMENT (OUTPATIENT)
Dept: CT IMAGING | Age: 52
End: 2022-05-17
Payer: OTHER GOVERNMENT

## 2022-05-17 ENCOUNTER — HOSPITAL ENCOUNTER (EMERGENCY)
Age: 52
Discharge: HOME | End: 2022-05-17
Payer: OTHER GOVERNMENT

## 2022-05-17 VITALS
OXYGEN SATURATION: 98 % | TEMPERATURE: 98.1 F | DIASTOLIC BLOOD PRESSURE: 83 MMHG | HEART RATE: 66 BPM | SYSTOLIC BLOOD PRESSURE: 118 MMHG | RESPIRATION RATE: 16 BRPM

## 2022-05-17 DIAGNOSIS — M54.41 CHRONIC RIGHT-SIDED LOW BACK PAIN WITH RIGHT-SIDED SCIATICA: Primary | ICD-10-CM

## 2022-05-17 DIAGNOSIS — G89.29 CHRONIC RIGHT-SIDED LOW BACK PAIN WITH RIGHT-SIDED SCIATICA: Primary | ICD-10-CM

## 2022-05-17 PROCEDURE — 99284 EMERGENCY DEPT VISIT MOD MDM: CPT

## 2022-05-17 PROCEDURE — 6370000000 HC RX 637 (ALT 250 FOR IP)

## 2022-05-17 PROCEDURE — 6360000002 HC RX W HCPCS

## 2022-05-17 PROCEDURE — 72131 CT LUMBAR SPINE W/O DYE: CPT

## 2022-05-17 PROCEDURE — 96372 THER/PROPH/DIAG INJ SC/IM: CPT

## 2022-05-17 RX ORDER — NAPROXEN 500 MG/1
500 TABLET ORAL 2 TIMES DAILY PRN
Qty: 20 TABLET | Refills: 0 | Status: SHIPPED | OUTPATIENT
Start: 2022-05-17 | End: 2022-05-27

## 2022-05-17 RX ORDER — KETOROLAC TROMETHAMINE 30 MG/ML
30 INJECTION, SOLUTION INTRAMUSCULAR; INTRAVENOUS ONCE
Status: COMPLETED | OUTPATIENT
Start: 2022-05-17 | End: 2022-05-17

## 2022-05-17 RX ORDER — METHOCARBAMOL 500 MG/1
500 TABLET, FILM COATED ORAL 4 TIMES DAILY PRN
Qty: 20 TABLET | Refills: 0 | Status: SHIPPED | OUTPATIENT
Start: 2022-05-17 | End: 2022-05-22

## 2022-05-17 RX ORDER — CYCLOBENZAPRINE HCL 10 MG
10 TABLET ORAL ONCE
Status: COMPLETED | OUTPATIENT
Start: 2022-05-17 | End: 2022-05-17

## 2022-05-17 RX ADMIN — CYCLOBENZAPRINE 10 MG: 10 TABLET, FILM COATED ORAL at 18:05

## 2022-05-17 RX ADMIN — KETOROLAC TROMETHAMINE 30 MG: 30 INJECTION, SOLUTION INTRAMUSCULAR; INTRAVENOUS at 18:05

## 2022-05-17 NOTE — ED NOTES
Patient states that he was in [place] waiting room for approx 3 hrs, then in the patient room for 1 hr. When Dr. Bret Turner decision to send him to ED.      Nilsa Mcguire RN  05/17/22 [id number]

## 2022-05-17 NOTE — ED PROVIDER NOTES
HPI, all other systems reviewed and are negative. Past Medical History:  has a past medical history of Asthma, Diabetes mellitus ([address] Hyperlipidemia, Migraine, and Thyroid disease. Surgical History:  has no past surgical history on file. Social History:  reports that he has never smoked. He has never used smokeless tobacco. He reports current alcohol use of about 3.0 standard drinks of alcohol per week. He reports that he does not use drugs. Family History: family history includes Cancer in his maternal grandfather; Diabetes Type 1  in his maternal grandmother; Diabetes type 2  in his father. Allergies: Patient has no known allergies. Physical Exam   Oxygen Saturation Interpretation: Normal.        ED Triage Vitals [05/17/22 1639]   BP Temp Temp src Pulse Resp SpO2 Height Weight   118/83 98.1 °F (36.7 °C) -- 66 16 98 % -- --         Constitutional:  Alert, development consistent with age. HEENT:  NC/NT. Airway patent. Neck:  Normal ROM. Supple. Respiratory:  Clear to auscultation and breath sounds equal.  CV:  Regular rate and rhythm, normal heart sounds, without pathological murmurs, ectopy, gallops, or rubs. GI:  Abdomen Soft, nontender, good bowel sounds. No firm or pulsatile mass. Back: middle lumbar spine right sided and midline. Tenderness: Mild. Swelling: no.              Range of Motion: full range with pain. CVA Tenderness: No CVA tenderness. Straight leg raising:  Right positive at 45 degrees. Skin:  no wounds, erythema, or swelling. Distal Function:              Motor deficit: none. Sensory deficit: none. Pulse deficit: none. Calf Tenderness:  No Bilateral.               Edema:  none Both lower extremity(s). Gait:  normal.  Integument:  Normal turgor. Warm, dry, without visible rash. Lymphatics: No lymphangitis or adenopathy noted. Neurological:  Oriented.   Motor functions intact. Lab / Imaging Results   (All laboratory and radiology results have been personally reviewed by myself)  Labs:  No results found for this visit on 05/17/22. Imaging: All Radiology results interpreted by Radiologist unless otherwise noted. CT LUMBAR SPINE WO CONTRAST   Final Result   1. No acute fracture or subluxation. 2. Bilateral spondylolysis at L5 with minimal grade 1 anterolisthesis at   L5-S1.   3. Multilevel degenerative disc disease with multilevel neural foraminal   narrowing. Neural foraminal narrowing is most pronounced at L5-S1. ED Course / Medical Decision Making     Medications   ketorolac (TORADOL) injection 30 mg (30 mg IntraMUSCular Given 5/17/22 1805)   cyclobenzaprine (FLEXERIL) tablet 10 mg (10 mg Oral Given 5/17/22 1805)        Re-examination:  5/17/22       Time: 1910   Patients condition is improving after treatment. Patient resting comfortably at this time chair, and is easily aroused. Patient has had improvement of pain. Consult(s):   None    Procedure(s):   none    Medical Decision Making/Counseling:    *At this time the patient is without objective evidence of an acute process requiring inpatient management. Presents emergency department for lower back pain which she states is chronic due to an injury he sustained in the [employer] several years ago. Patient states that he was lifting some heavy items 2 days ago, and he thinks that he flared up the back pain. Patient has had no loss of bowel or bladder, saddle paresthesia, fever or chills. Patient is ambulatory in the department. Imaging was obtained. The lumbar spine shows no acute fracture or subluxation. Bilateral spondylolysis at L5 with minimal grade 1 anterolisthesis at L5-S1. Multilevel degenerative disc disease with multilevel neuroforaminal narrowing. Neuroforaminal narrowing is most pronounced at L5-S1. These findings were discussed with the patient, and he verbalized understanding. Patient is requesting a referral for neurosurgery at this time. Has had improvement of pain after receiving medication in the department. Advised patient that if the symptoms persist he may need to follow-up with his PCP for a nonemergent outpatient MRI. The emergency provider has spoken with the patient and discussed todays emergency visit, in addition to providing specific details for the plan of care and counseling regarding the diagnosis and prognosis. Questions are answered at this time and they are agreeable with the plan. Minutes for discharge, with outpatient management and follow-up with PCP. Patient and significant other are agreeable to this plan. At this time the patient is without objective evidence of an acute process requiring hospitalization or inpatient management. They have remained hemodynamically stable throughout their entire ED visit and are stable for discharge with outpatient follow-up. The plan has been discussed in detail and they are aware of the specific conditions for emergent return, as well as the importance of follow-up. Assessment      1. Chronic right-sided low back pain with right-sided sciatica      Plan   Discharged home. Patient condition is good    New Medications     Discharge Medication List as of 5/17/2022  7:36 PM      START taking these medications    Details   methocarbamol (ROBAXIN) 500 MG tablet Take 1 tablet by mouth 4 times daily as needed (Muscle spasms), Disp-20 tablet, R-0Print      naproxen (NAPROSYN) 500 MG tablet Take 1 tablet by mouth 2 times daily as needed for Pain (with meals), Disp-20 tablet, R-0Print           Electronically signed by FERNANDO Tavarez CNP   DD: 5/17/22  **This report was transcribed using voice recognition software. Every effort was made to ensure accuracy; however, inadvertent computerized transcription errors may be present.   END OF ED PROVIDER NOTE     FERNANDO Tavarez CNP  05/17/22 1949

## 2022-05-18 DIAGNOSIS — E10.65 TYPE 1 DIABETES MELLITUS WITH HYPERGLYCEMIA (HCC): ICD-10-CM

## 2022-05-18 NOTE — TELEPHONE ENCOUNTER
The pt's Omnipod Adria Ny was approved per Kevin CaseId:[id number];Status:Approved; Review Type:Prior Auth; Coverage Start Date:03/02/2022; Coverage End Date:12/31/2099;     Attached is an outdated letter that was mailed to the patient

## 2022-08-15 ENCOUNTER — TELEPHONE (OUTPATIENT)
Dept: ENDOCRINOLOGY | Age: 52
End: 2022-08-15

## 2022-08-15 NOTE — TELEPHONE ENCOUNTER
Pt Omnipod dash 5 pack pods were approved via phone through [employer].  Approval # [id number] effective dates 7/16/22-8/15/23

## 2022-08-25 ENCOUNTER — OFFICE VISIT (OUTPATIENT)
Dept: ENDOCRINOLOGY | Age: 52
End: 2022-08-25
Payer: OTHER GOVERNMENT

## 2022-08-25 VITALS
DIASTOLIC BLOOD PRESSURE: 78 MMHG | BODY MASS INDEX: 28.66 KG/M2 | HEIGHT: 65 IN | HEART RATE: 65 BPM | SYSTOLIC BLOOD PRESSURE: 140 MMHG | WEIGHT: 172 LBS

## 2022-08-25 DIAGNOSIS — E03.9 HYPOTHYROIDISM, UNSPECIFIED TYPE: ICD-10-CM

## 2022-08-25 DIAGNOSIS — Z96.41 INSULIN PUMP IN PLACE: ICD-10-CM

## 2022-08-25 DIAGNOSIS — E55.9 VITAMIN D DEFICIENCY: ICD-10-CM

## 2022-08-25 DIAGNOSIS — E10.65 TYPE 1 DIABETES MELLITUS WITH HYPERGLYCEMIA (HCC): Primary | ICD-10-CM

## 2022-08-25 LAB — HBA1C MFR BLD: 7.4 %

## 2022-08-25 PROCEDURE — 99214 OFFICE O/P EST MOD 30 MIN: CPT | Performed by: NURSE PRACTITIONER

## 2022-08-25 PROCEDURE — 83036 HEMOGLOBIN GLYCOSYLATED A1C: CPT | Performed by: NURSE PRACTITIONER

## 2022-08-25 PROCEDURE — 3051F HG A1C>EQUAL 7.0%<8.0%: CPT | Performed by: NURSE PRACTITIONER

## 2022-08-25 NOTE — PROGRESS NOTES
[address] [place] [address] Department of Endocrinology Diabetes and Metabolism   [address] [facility] [address]   Phone: [phone]  Fax: [phone]    Date of Service: 8/25/2022  Primary Care Physician: Brandt Foster MD  Provider: FERNANDO Patricio NP\     Reason for the visit:Type 1 diabetes       History of Present Illness: The history is provided by the patient. No  was used. Accuracy of the patient data is excellent. Holli Olmos is a very pleasant 54 y.o. male seen today for diabetes management     Holli Olmos was diagnosed with diabetes at age 41 and currently on Omni Pod     Current pump settings: Basal  12am  0.8, 7am 0.6  CR  12am 8.5, 5pm 6.5,   ISF  75   AIT  3    The patient has been checking blood sugar 4 times a day and most readings at goal     No interested in CGM     Unable to download OmniPod today     Most recent A1c results summarized below    2/2022 - A1c 7.5   Lab Results   Component Value Date/Time    LABA1C 7.4 08/25/2022 04:06 PM    LABA1C 7.2 07/08/2021 11:20 AM     The patient has been mindful of what has been eating and following diabetic diet as encouraged  I reviewed current medications and the patient has no issues with diabetes medications  Holli Olmos is up to date with eye exam and denied any history of diabetic retinopathy   The patient performs his own feet care  Microvascular complications:  No Retinopathy, Nephropathy or Neuropathy   Macrovascular complications: no CAD, PVD, or Stroke    PAST MEDICAL HISTORY   Past Medical History:   Diagnosis Date    Asthma     Diabetes mellitus ([address]     Hyperlipidemia     Migraine     Thyroid disease        PAST SURGICAL HISTORY   No past surgical history on file. SOCIAL HISTORY   Tobacco:   reports that he has never smoked. He has never used smokeless tobacco.  Alcohol:   reports current alcohol use of about 3.0 standard drinks per week.   Drugs:   reports no history of drug 05/16/2022 01:59 AM    MALBCR - 09/02/2021 02:12 PM    LABMICR <12.0 09/02/2021 02:12 PM    LABCREA 131 09/02/2021 02:12 PM     Lab Results   Component Value Date/Time    LABA1C 7.4 08/25/2022 04:06 PM    LABA1C 7.2 07/08/2021 11:20 AM     Lab Results   Component Value Date/Time    TRIG 154 09/02/2021 02:12 PM    HDL 49 09/02/2021 02:12 PM    LDLCALC 119 09/02/2021 02:12 PM    CHOL 199 09/02/2021 02:12 PM     Lab Results   Component Value Date/Time    VITD25 34 09/02/2021 02:12 PM       ASSESSMENT & RECOMMENDATIONS   Néstor Amaral, a 54 y.o.-old male seen in for the following issues       Assessment:      Diagnosis Orders   1. Type 1 diabetes mellitus with hyperglycemia (HCC)  POCT glycosylated hemoglobin (Hb A1C)            Plan:     1. Type 1 diabetes mellitus with hyperglycemia (HCC)   Under good control A1c 7.4%  Unable to download pump today   Adjust current pump settings:  Basal  12am  0.8, add  4am  0.85, 7am 0.6  CR  12am 8.5, 5pm 5.5,   ISF  75   AIT  3  Advised patient to continue checking blood sugars 4 times per day  Counseled on hypoglycemia  Not interested in CGM at this time   A1c at next OV    Continuous Glucose Monitoring (CGM) download and interpretation   I personally reviewed and interpreted continuous glucose monitor (CGM) download. CGM report was discussed with patient including blood glucose patterns, percentages of blood glucose at goal, above goal and below goal. Insulin dosages/antidiabetic regimen was adjusted according to CGM download. Full CGM was scanned under media. 2. Primary hypothyroidism   Off Levothyroxine  (medication was stopped by his PCP)  Pt would like to stay of LT4 at this time   Check TFT at next OV      3.  Vitamin D deficiency  Continue vitamin D supplementation  - ok to take 2 tablets daily   [facility]     4.      5. Insulin pump in place       HLD  On statin therapy  Recheck levels       I personally reviewed external notes from PCP and other

## 2022-10-10 ENCOUNTER — TELEPHONE (OUTPATIENT)
Dept: ENDOCRINOLOGY | Age: 52
End: 2022-10-10

## 2022-10-10 NOTE — TELEPHONE ENCOUNTER
Patient called with concerns about why he got a bill for a visit in our office when he is a patient from the [place]. Spoke with Marie Yancey about patients concerns. Marie Yancey noticed patient needs a new authorization to be seen in the office. Stated [facility] needs to [facility] to see if [facility] can get an [facility] number. If not the office can. Notified Hi Mallory from clinical at this time about this as well. Called patient back to inform him but had to leave a voicemail.

## 2022-11-15 DIAGNOSIS — E10.65 TYPE 1 DIABETES MELLITUS WITH HYPERGLYCEMIA (HCC): ICD-10-CM

## 2022-11-17 RX ORDER — INSULIN PUMP CONTROLLER
EACH MISCELLANEOUS
Qty: 45 EACH | Refills: 3 | Status: SHIPPED | OUTPATIENT
Start: 2022-11-17

## 2023-02-06 DIAGNOSIS — E10.65 TYPE 1 DIABETES MELLITUS WITH HYPERGLYCEMIA (HCC): ICD-10-CM

## 2023-02-06 DIAGNOSIS — E10.65 TYPE 1 DIABETES MELLITUS WITH HYPERGLYCEMIA (HCC): Primary | ICD-10-CM

## 2023-02-06 RX ORDER — INSULIN PMP CART,AUT,G6/7,CNTR
EACH SUBCUTANEOUS
Qty: 10 EACH | Refills: 5 | Status: SHIPPED | OUTPATIENT
Start: 2023-02-06

## 2023-02-06 RX ORDER — INSULIN PUMP CONTROLLER
EACH MISCELLANEOUS
Qty: 45 EACH | Refills: 3 | Status: SHIPPED | OUTPATIENT
Start: 2023-02-06

## 2023-02-06 RX ORDER — INSULIN PUMP CONTROLLER
EACH MISCELLANEOUS
Qty: 1 KIT | Refills: 0 | Status: CANCELLED | OUTPATIENT
Start: 2023-02-06

## 2023-02-06 RX ORDER — INSULIN PUMP CONTROLLER
EACH MISCELLANEOUS
Qty: 10 EACH | Refills: 3 | Status: CANCELLED | OUTPATIENT
Start: 2023-02-06

## 2023-02-06 RX ORDER — INSULIN PMP CART,AUT,G6/7,CNTR
EACH SUBCUTANEOUS
Qty: 1 KIT | Refills: 0 | Status: SHIPPED | OUTPATIENT
Start: 2023-02-06

## 2023-02-27 DIAGNOSIS — E10.65 TYPE 1 DIABETES MELLITUS WITH HYPERGLYCEMIA (HCC): ICD-10-CM

## 2023-02-27 RX ORDER — INSULIN PUMP CONTROLLER
EACH MISCELLANEOUS
Qty: 45 EACH | Refills: 3 | Status: SHIPPED | OUTPATIENT
Start: 2023-02-27

## 2023-06-20 ENCOUNTER — HOSPITAL ENCOUNTER (EMERGENCY)
Age: 53
Discharge: HOME HEALTH | End: 2023-06-20
Attending: EMERGENCY MEDICINE
Payer: OTHER GOVERNMENT

## 2023-06-20 ENCOUNTER — APPOINTMENT (OUTPATIENT)
Dept: GENERAL RADIOLOGY | Age: 53
End: 2023-06-20
Payer: OTHER GOVERNMENT

## 2023-06-20 VITALS
HEIGHT: 65 IN | WEIGHT: 172 LBS | HEART RATE: 80 BPM | SYSTOLIC BLOOD PRESSURE: 120 MMHG | BODY MASS INDEX: 28.66 KG/M2 | RESPIRATION RATE: 18 BRPM | OXYGEN SATURATION: 97 % | TEMPERATURE: 97.9 F | DIASTOLIC BLOOD PRESSURE: 84 MMHG

## 2023-06-20 DIAGNOSIS — Z00.00 ENCOUNTER FOR HEALTH CHECK: ICD-10-CM

## 2023-06-20 DIAGNOSIS — L81.9 DISCOLORATION OF SKIN OF HAND: Primary | ICD-10-CM

## 2023-06-20 LAB
ALBUMIN SERPL-MCNC: 4.3 G/DL (ref 3.5–5.2)
ALP SERPL-CCNC: 84 U/L (ref 40–129)
ALT SERPL-CCNC: 19 U/L (ref 0–40)
ANION GAP SERPL CALCULATED.3IONS-SCNC: 13 MMOL/L (ref 7–16)
AST SERPL-CCNC: 20 U/L (ref 0–39)
B PARAP IS1001 DNA NPH QL NAA+NON-PROBE: NOT DETECTED
B PERT.PT PRMT NPH QL NAA+NON-PROBE: NOT DETECTED
BACTERIA URNS QL MICRO: ABNORMAL /HPF
BASOPHILS # BLD: 0.1 E9/L (ref 0–0.2)
BASOPHILS NFR BLD: 0.8 % (ref 0–2)
BILIRUB SERPL-MCNC: 0.9 MG/DL (ref 0–1.2)
BILIRUB UR QL STRIP: NEGATIVE
BNP BLD-MCNC: 36 PG/ML (ref 0–125)
BUN SERPL-MCNC: 21 MG/DL (ref 6–20)
C PNEUM DNA NPH QL NAA+NON-PROBE: NOT DETECTED
CALCIUM SERPL-MCNC: 9.4 MG/DL (ref 8.6–10.2)
CHLORIDE SERPL-SCNC: 102 MMOL/L (ref 98–107)
CLARITY UR: CLEAR
CO2 SERPL-SCNC: 24 MMOL/L (ref 22–29)
COLOR UR: YELLOW
CREAT SERPL-MCNC: 1 MG/DL (ref 0.7–1.2)
D DIMER: <200 NG/ML DDU
EOSINOPHIL # BLD: 0.05 E9/L (ref 0.05–0.5)
EOSINOPHIL NFR BLD: 0.4 % (ref 0–6)
ERYTHROCYTE [DISTWIDTH] IN BLOOD BY AUTOMATED COUNT: 12.3 FL (ref 11.5–15)
FLUAV RNA NPH QL NAA+NON-PROBE: NOT DETECTED
FLUBV RNA NPH QL NAA+NON-PROBE: NOT DETECTED
GLUCOSE SERPL-MCNC: 206 MG/DL (ref 74–99)
GLUCOSE UR STRIP-MCNC: NEGATIVE MG/DL
HADV DNA NPH QL NAA+NON-PROBE: NOT DETECTED
HCOV 229E RNA NPH QL NAA+NON-PROBE: NOT DETECTED
HCOV HKU1 RNA NPH QL NAA+NON-PROBE: NOT DETECTED
HCOV NL63 RNA NPH QL NAA+NON-PROBE: NOT DETECTED
HCOV OC43 RNA NPH QL NAA+NON-PROBE: NOT DETECTED
HCT VFR BLD AUTO: 41.4 % (ref 37–54)
HGB BLD-MCNC: 13.9 G/DL (ref 12.5–16.5)
HGB UR QL STRIP: ABNORMAL
HMPV RNA NPH QL NAA+NON-PROBE: NOT DETECTED
HPIV1 RNA NPH QL NAA+NON-PROBE: NOT DETECTED
HPIV2 RNA NPH QL NAA+NON-PROBE: NOT DETECTED
HPIV3 RNA NPH QL NAA+NON-PROBE: NOT DETECTED
HPIV4 RNA NPH QL NAA+NON-PROBE: NOT DETECTED
IMM GRANULOCYTES # BLD: 0.05 E9/L
IMM GRANULOCYTES NFR BLD: 0.4 % (ref 0–5)
KETONES UR STRIP-MCNC: NEGATIVE MG/DL
LEUKOCYTE ESTERASE UR QL STRIP: NEGATIVE
LYMPHOCYTES # BLD: 2.17 E9/L (ref 1.5–4)
LYMPHOCYTES NFR BLD: 16.8 % (ref 20–42)
M PNEUMO DNA NPH QL NAA+NON-PROBE: NOT DETECTED
MCH RBC QN AUTO: 30.9 PG (ref 26–35)
MCHC RBC AUTO-ENTMCNC: 33.6 % (ref 32–34.5)
MCV RBC AUTO: 92 FL (ref 80–99.9)
MONOCYTES # BLD: 0.72 E9/L (ref 0.1–0.95)
MONOCYTES NFR BLD: 5.6 % (ref 2–12)
NEUTROPHILS # BLD: 9.81 E9/L (ref 1.8–7.3)
NEUTS SEG NFR BLD: 76 % (ref 43–80)
NITRITE UR QL STRIP: NEGATIVE
PH UR STRIP: 5.5 [PH] (ref 5–9)
PLATELET # BLD AUTO: 282 E9/L (ref 130–450)
PMV BLD AUTO: 9.9 FL (ref 7–12)
POTASSIUM SERPL-SCNC: 3.8 MMOL/L (ref 3.5–5)
PROT SERPL-MCNC: 7.1 G/DL (ref 6.4–8.3)
PROT UR STRIP-MCNC: NEGATIVE MG/DL
RBC # BLD AUTO: 4.5 E12/L (ref 3.8–5.8)
RBC #/AREA URNS HPF: ABNORMAL /HPF (ref 0–2)
RSV RNA NPH QL NAA+NON-PROBE: NOT DETECTED
RV+EV RNA NPH QL NAA+NON-PROBE: NOT DETECTED
SARS-COV-2 RNA NPH QL NAA+NON-PROBE: NOT DETECTED
SODIUM SERPL-SCNC: 139 MMOL/L (ref 132–146)
SP GR UR STRIP: >=1.03 (ref 1–1.03)
TROPONIN, HIGH SENSITIVITY: 7 NG/L (ref 0–11)
UROBILINOGEN UR STRIP-ACNC: 0.2 E.U./DL
WBC # BLD: 12.9 E9/L (ref 4.5–11.5)
WBC #/AREA URNS HPF: ABNORMAL /HPF (ref 0–5)

## 2023-06-20 PROCEDURE — 71045 X-RAY EXAM CHEST 1 VIEW: CPT

## 2023-06-20 PROCEDURE — 83880 ASSAY OF NATRIURETIC PEPTIDE: CPT

## 2023-06-20 PROCEDURE — 99285 EMERGENCY DEPT VISIT HI MDM: CPT

## 2023-06-20 PROCEDURE — 85378 FIBRIN DEGRADE SEMIQUANT: CPT

## 2023-06-20 PROCEDURE — 84484 ASSAY OF TROPONIN QUANT: CPT

## 2023-06-20 PROCEDURE — 80053 COMPREHEN METABOLIC PANEL: CPT

## 2023-06-20 PROCEDURE — 81001 URINALYSIS AUTO W/SCOPE: CPT

## 2023-06-20 PROCEDURE — 93005 ELECTROCARDIOGRAM TRACING: CPT | Performed by: EMERGENCY MEDICINE

## 2023-06-20 PROCEDURE — 0202U NFCT DS 22 TRGT SARS-COV-2: CPT

## 2023-06-20 PROCEDURE — 85025 COMPLETE CBC W/AUTO DIFF WBC: CPT

## 2023-06-20 ASSESSMENT — ENCOUNTER SYMPTOMS
WHEEZING: 0
SHORTNESS OF BREATH: 0
APNEA: 0
EYE ITCHING: 0
EYE DISCHARGE: 0
COLOR CHANGE: 1
BACK PAIN: 0
CHEST TIGHTNESS: 0
ABDOMINAL DISTENTION: 0
STRIDOR: 0
ABDOMINAL PAIN: 0

## 2023-06-20 ASSESSMENT — PAIN - FUNCTIONAL ASSESSMENT: PAIN_FUNCTIONAL_ASSESSMENT: NONE - DENIES PAIN

## 2023-06-21 NOTE — ED PROVIDER NOTES
written instructions upon discharge, return precautions and followup instructions were extensively discussed with the patient. They verbally acknowledged understanding of these instructions without any apparent barriers to learning. When the patient was discharged from the ED, the patient was given specific instructions and information regarding their illness. The patient was verbally instructed to return to the ER urgently for any worsening symptoms OR failure of symptom improvement over the next 12-24 hours and this was also written in the discharge instructions. In the patients discharge instructions I told them to follow-up with their primary care provider in 1-2 days as well as to follow-up with any specialists as necessary. If they did not have a primary care provider I gave them an alternate clinic to call and make an appointment. I also included diagnosis-specific educational material in their discharge paperwork. So Sullivan DO  Resident  06/20/23 [id number]  ATTENDING PROVIDER ATTESTATION:     I have personally performed and/or participated in the history, exam, medical decision making, and procedures and agree with all pertinent clinical information. I have also reviewed and agree with the past medical, family and social history unless otherwise noted. I have discussed this patient in detail with the resident, and provided the instruction and education regarding hand discoloration. My findings/Plan: I was the primary provider for patient. Patient with history of hypothyroidism diabetes hyperlipidemia asthma presenting because of reported discoloration to fingers she reports that it appeared to be blue. Patient reporting some mild shortness of breath he reports he has had some congestion cough for the last several months thought he had COVID. Patient reporting no abdominal pain he reports no nausea or vomiting.   Patient reporting no productive cough he reports no leg pain or

## 2023-06-22 LAB
EKG ATRIAL RATE: 78 BPM
EKG P AXIS: 55 DEGREES
EKG P-R INTERVAL: 176 MS
EKG Q-T INTERVAL: 372 MS
EKG QRS DURATION: 90 MS
EKG QTC CALCULATION (BAZETT): 424 MS
EKG R AXIS: -5 DEGREES
EKG T AXIS: 30 DEGREES
EKG VENTRICULAR RATE: 78 BPM

## 2023-06-22 PROCEDURE — 93010 ELECTROCARDIOGRAM REPORT: CPT | Performed by: INTERNAL MEDICINE
